# Patient Record
(demographics unavailable — no encounter records)

---

## 2025-02-18 NOTE — HISTORY OF PRESENT ILLNESS
[No Personal or Family History of Easy Bruising, Bleeding, or Issues with General Anesthesia] : No Personal or Family History of easy bruising, bleeding, or issues with general anesthesia [de-identified] : Juan is a 23 day old baby boy here for evaluation of tongue tie and nasal congestion.  Here with parents who provide history.   Tongue tie noted since birth.  Breast feeding and doing fairly well. Some trouble with latching. Sometimes milk dribbles down side of mouth.  No significant maternal pain with breast feeding.  Sometimes with clicking noise while eating.  No stridor.  Gaining weight. Today 9lb 1.75oz (45th percentile for weight).   Also with nasal congestion.  Worse when spits up.  Using nasal bulb without much mucus.   No hearing concerns.  Passed  hearing test.  No other otolaryngology concerns today.

## 2025-02-18 NOTE — PHYSICAL EXAM
[Normal Gait and Station] : normal gait and station [Normal muscle strength, symmetry and tone of facial, head and neck musculature] : normal muscle strength, symmetry and tone of facial, head and neck musculature [Normal] : no cervical lymphadenopathy [Exposed Vessel] : left anterior vessel not exposed [Increased Work of Breathing] : no increased work of breathing with use of accessory muscles and retractions [de-identified] : tongue tie

## 2025-02-18 NOTE — CONSULT LETTER
[Dear  ___] : Dear  [unfilled], [Courtesy Letter:] : I had the pleasure of seeing your patient, [unfilled], in my office today. [Please see my note below.] : Please see my note below. [Sincerely,] : Sincerely, [FreeTextEntry3] : Twila Johnson M.D. Pediatric [place] [address] Tel [phone] Fax [phone]

## 2025-02-18 NOTE — REASON FOR VISIT
[Initial Evaluation] : an initial evaluation for [Parents] : parents [FreeTextEntry2] : Tongue tie, nasal congestion

## 2025-02-18 NOTE — PROCEDURE
[Flexible Scope  (R)] : Flexible Scope (R) [Flexible Scope  (L)] : Flexible Scope (L) [None] : None [FreeTextEntry1] : Nasal Congestion [FreeTextEntry2] : Nasal Congestion [FreeTextEntry3] : After informed verbal consent, nasal endoscopy was performed due to anterior rhinoscopy insufficient to account for symptoms.   The nasal endoscope is passed via the nasal cavity. Flexible endoscope was used. Septum was midline. The inferior, middle, and superior turbinates and inferior, middle and superior meati were examined and with healthy appearing mucosa. The osteomeatal complex is clear with no polyposis or purulence. The sphenoethmoidal recess is clear with no polyposis or purulence. No masses or lesions noted. The choana is patent. The opposite nasal cavity and nasopharynx was examined with similar findings.   There is 10% obstruction of the nasopharynx with adenoid tissue. No other masses.  Normal closure of the velopharyngeal sphincter. Normal shape and mucosalization of the epiglottis, normal appearing aryepiglottic folds and arytenoids. Normal mobility of right and left vocal cords. No vocal cord lesions. Subglottis as able to visualize appears patent. Normal appearing post-cricoid region without pooling of secretions.   Patient tolerated the procedure well.

## 2025-02-18 NOTE — ASSESSMENT
[FreeTextEntry1] : We reviewed nasal endoscopy and laryngoscopy findings which were normal. We discussed use of nasal saline and nasal suctioning as needed for nasal congestion.  We discussed the risks, benefits, alternatives, and personnel involved in lingual frenulectomy. Risks include, but are not limited to, bleeding potentially requiring cauterization, scarring, lingual reattachment, and damage to surrounding structures including salivary ductal opening. Parents express understanding and provides consent for the procedure.   Frenulectomy completed in office today. The patient was observed for 15 minutes and no additional bleeding was noted.

## 2025-02-19 NOTE — PROCEDURE
[FreeTextEntry6] : Dx:  Congenital ear deformity, right and left  Procedure:  Infant ear molding using silicone prosthesis CPT- 58367H/ 69047Q	[id number]   Physician:  Ephraim Espinosa M.D., [facility]  Anesthesia:  none  Complications;  none  Condition:  good  Clinical Summary: The patient was noted to have a bilateral congenital ear deformity at birth, which has not improved. The patient is referred by pediatrician for correction of the deformity using infant ear molding.  There is a constricted ear deformity of the left and right ears that are amenable to ear molding.    Procedure Note: After completion of feeding, the baby was swaddled and laid on the left side. A silicone impression was taken using putty. The ear was measured for size and an appropriate base was fashioned from a  large cradle.  A medium retainer was bent and fabricated to the  appropriate size to prevent  encroachment on the retroauricular sulcus and there was adequate dimension within the cradle for the full dimension of the pinna.  Hair was then shaved to leave approximately a one quarter of an inch boundary beyond the adhesive footplate of the posterior cradle. Skin prep was next done with alcohol pads to remove any residual skin oil. The posterior cradle was then slipped over the ear and the posterior conformer aligned precisely with the desired position of the superior limb of the triangular fossa. Care was taken to leave approximately a 1 mm space between the posterior conformer and the retroauricular sulcus. The pinna was then displaced back into the cradle to ensure that the superior limb of the triangular fossa was in perfect alignment with the anti-helical fold. Next the adhesive liners of the posterior cradle were removed allowing the cradle to be secured to the scalp. In addition it was necessary to bend the retractor so as to conform to the ideal shape of the helical rim. The retractor was directly positioned over the abnormal shape of the helical rim. With these adjustments made the internal adhesive liners were removed and the retractor affixed to the inner surface of the cradle. The baby was then placed on the opposite side and the contralateral identical procedure was performed.

## 2025-02-19 NOTE — REVIEW OF SYSTEMS
[As Noted in HPI] : as noted in HPI [Negative] : Heme/Lymph [de-identified] : baby acne, seborrheic dermatitia

## 2025-02-19 NOTE — HISTORY OF PRESENT ILLNESS
[FreeTextEntry1] : A 23-day-old infant presents to the office with congenital bilateral ear deformities, which were noted at birth and have not shown any improvement. The baby was born at 37.6 weeks via vaginal delivery, with no reported complications during pregnancy or delivery.   -Birth Weight: 6.8 lbs - Current Weigh: 9.2 lbs - Feeding: Breast milk  The parents report no family history of ear deformities. The infant is otherwise healthy, with normal feeding and elimination patterns, and has shown normal development up to this point. mom is  NP

## 2025-02-19 NOTE — PROCEDURE
[FreeTextEntry6] : Dx:  Congenital ear deformity, right and left  Procedure:  Infant ear molding using silicone prosthesis CPT- 35580S/ 69272W	[id number]   Physician:  Ephraim Espinosa M.D., [facility]  Anesthesia:  none  Complications;  none  Condition:  good  Clinical Summary: The patient was noted to have a bilateral congenital ear deformity at birth, which has not improved. The patient is referred by pediatrician for correction of the deformity using infant ear molding.  There is a constricted ear deformity of the left and right ears that are amenable to ear molding.    Procedure Note: After completion of feeding, the baby was swaddled and laid on the left side. A silicone impression was taken using putty. The ear was measured for size and an appropriate base was fashioned from a  large cradle.  A medium retainer was bent and fabricated to the  appropriate size to prevent  encroachment on the retroauricular sulcus and there was adequate dimension within the cradle for the full dimension of the pinna.  Hair was then shaved to leave approximately a one quarter of an inch boundary beyond the adhesive footplate of the posterior cradle. Skin prep was next done with alcohol pads to remove any residual skin oil. The posterior cradle was then slipped over the ear and the posterior conformer aligned precisely with the desired position of the superior limb of the triangular fossa. Care was taken to leave approximately a 1 mm space between the posterior conformer and the retroauricular sulcus. The pinna was then displaced back into the cradle to ensure that the superior limb of the triangular fossa was in perfect alignment with the anti-helical fold. Next the adhesive liners of the posterior cradle were removed allowing the cradle to be secured to the scalp. In addition it was necessary to bend the retractor so as to conform to the ideal shape of the helical rim. The retractor was directly positioned over the abnormal shape of the helical rim. With these adjustments made the internal adhesive liners were removed and the retractor affixed to the inner surface of the cradle. The baby was then placed on the opposite side and the contralateral identical procedure was performed.

## 2025-02-19 NOTE — REVIEW OF SYSTEMS
[As Noted in HPI] : as noted in HPI [Negative] : Heme/Lymph [de-identified] : baby acne, seborrheic dermatitia

## 2025-03-11 NOTE — CONSULT LETTER
[Dear  ___] : Dear  [unfilled], [Consult Letter:] : I had the pleasure of evaluating your patient, [unfilled]. [Please see my note below.] : Please see my note below. [Consult Closing:] : Thank you very much for allowing me to participate in the care of this patient.  If you have any questions, please do not hesitate to contact me. [Sincerely,] : Sincerely, [FreeTextEntry3] : Dr. Constance Olivera Department of Dermatology [facility]

## 2025-03-11 NOTE — ASSESSMENT
[Use of independent historian: [ enter independent historian's relationship to patient ] :____] : As the patient was unable to provide a complete and reliable history, I obtained clinical history from the patient's [unfilled] [FreeTextEntry1] : #Impetigo new diagnosis of uncertain prognosis - education and counseling - start Cephalexin 40mg/kg div TID x 7 days - 1.4mL of 250mg/5mL soln TID - advised not to cross contaminate personal care products take probiotic  # Atopic Dermatitis - mild new diagnosis of uncertain prognosis - we have discussed the nature and course of this condition, treatment options and expectations. - apply Hydrocortisone 2.5% ointment to face/neck BID PRN flares w roughness/itch. Do not use on eyelids  Dry skin care reviewed:  - Take short showers/baths (avoid hot water)  - Use a mild soap (eg. CeraVe cleanser or Aquaphor)  - Use soap only on areas truly needed (underarms,groin,buttocks,fold areas, feet, face, hair)  - Pat off excess water and put moisturizer on immediately (within 3 min.)              Good moisturizing choices include:                       1. Cetaphil cream (not baby Cetaphil)                       2. CeraVe cream                       3. Vanicream cream                       4. Aquaphor ointment                       5. Vaseline ointment                       6. CeraVe ointment  - A moisturizer should always be applied after showering or bathing, but may be applied as many additional times as is necessary.  RTC

## 2025-03-11 NOTE — PHYSICAL EXAM
[Well Nourished] : well nourished [Conjunctiva Non-injected] : conjunctiva non-injected [No Visual Lymphadenopathy] : no visual  lymphadenopathy [No Clubbing] : no clubbing [No Edema] : no edema [No Bromhidrosis] : no bromhidrosis [No Chromhidrosis] : no chromhidrosis [FreeTextEntry3] : round red erosions, some with yellow crusting, scattered on the trunk, extremities, hands roughness and follicular prominence along trunk. cheeks erythematous plaques with thick

## 2025-03-11 NOTE — HISTORY OF PRESENT ILLNESS
[FreeTextEntry1] : npa - rash [de-identified] : Pt is a 1 month old M presenting for red lesions on body. Pt was wearing ear molds that got infected, culture grew MSSA. After developing the infection, he began to develop round red erosions first on the upper extremities then spreading to trunk/legs. Mom applied mupirocin w some improvement. Continuing to develop new lesions. Also with dry skin and flaky scalp. Never with fevers, chills, fussiness.  Notably, older sister Elisa w impetigo as well.  Freq of baths: Daily Soap: CeraVe Moisturizer: CeraVe Healing Oint Detergent: All Free and Clear

## 2025-03-28 NOTE — CONSULT LETTER
[Dear  ___] : Dear  [unfilled], [Consult Letter:] : I had the pleasure of evaluating your patient, [unfilled]. [Please see my note below.] : Please see my note below. [Consult Closing:] : Thank you very much for allowing me to participate in the care of this patient.  If you have any questions, please do not hesitate to contact me. [Sincerely,] : Sincerely, [FreeTextEntry3] : Dr. Constance Olivera Department of Dermatology [facility]

## 2025-03-28 NOTE — ASSESSMENT
[Use of independent historian: [ enter independent historian's relationship to patient ] :____] : As the patient was unable to provide a complete and reliable history, I obtained clinical history from the patient's [unfilled] [FreeTextEntry1] : #Impetigo - education and counseling, ensure all family members are treated  - start Cephalexin 40mg/kg div TID x 10-14 days - 1.4mL of 250mg/5mL soln TID - Mupirocin TID  - CLN wash, may use hibiclens while patient has active lesions   - advised not to cross contaminate personal care products - take probiotic  # Atopic Dermatitis - mild - we have discussed the nature and course of this condition, treatment options and expectations. - apply Hydrocortisone 2.5% ointment to face/neck BID PRN flares w roughness/itch. Do not use on eyelids  Dry skin care reviewed:  - Take short showers/baths (avoid hot water)  - Use a mild soap (eg. CeraVe cleanser or Aquaphor)  - Use soap only on areas truly needed (underarms,groin,buttocks,fold areas, feet, face, hair)  - Pat off excess water and put moisturizer on immediately (within 3 min.)              Good moisturizing choices include:                       1. Cetaphil cream (not baby Cetaphil)                       2. CeraVe cream                       3. Vanicream cream                       4. Aquaphor ointment                       5. Vaseline ointment                       6. CeraVe ointment  - A moisturizer should always be applied after showering or bathing, but may be applied as many additional times as is necessary.  RTC 1m or PRN

## 2025-03-28 NOTE — HISTORY OF PRESENT ILLNESS
[FreeTextEntry1] : f/u impetigo  [de-identified] : Pt is a 2 month old M presenting for follow up of bullous impetigo. Patient just completed a 7 day course of Keflex and mupirocin and rash had cleared totally for a few days until grandmother watched the child. Now he has new lesions to right ear, trunk, and extremities. Mom is concerned grandma may be colonized. Mom now also has a new honey-crusted appearing lesion on her nose. No recent fevers.   Freq of baths: Daily Soap: La Roche Oil Cleanser  Moisturizer: Vaseline  Detergent: All Free and Clear  History:  Pt was wearing ear molds that got infected, culture grew MSSA. Notably, older sister Elisa w impetigo as well.

## 2025-03-28 NOTE — CONSULT LETTER
[Dear  ___] : Dear  [unfilled], [Consult Letter:] : I had the pleasure of evaluating your patient, [unfilled]. [Please see my note below.] : Please see my note below. [Consult Closing:] : Thank you very much for allowing me to participate in the care of this patient.  If you have any questions, please do not hesitate to contact me. [Sincerely,] : Sincerely, [FreeTextEntry3] : Dr. Constance Olivera Department of Dermatology [facility]

## 2025-03-28 NOTE — HISTORY OF PRESENT ILLNESS
[FreeTextEntry1] : f/u impetigo  [de-identified] : Pt is a 2 month old M presenting for follow up of bullous impetigo. Patient just completed a 7 day course of Keflex and mupirocin and rash had cleared totally for a few days until grandmother watched the child. Now he has new lesions to right ear, trunk, and extremities. Mom is concerned grandma may be colonized. Mom now also has a new honey-crusted appearing lesion on her nose. No recent fevers.   Freq of baths: Daily Soap: La Roche Oil Cleanser  Moisturizer: Vaseline  Detergent: All Free and Clear  History:  Pt was wearing ear molds that got infected, culture grew MSSA. Notably, older sister Elisa w impetigo as well.

## 2025-03-28 NOTE — PHYSICAL EXAM
[Alert] : alert [Well Nourished] : well nourished [Conjunctiva Non-injected] : conjunctiva non-injected [FreeTextEntry3] : round red erosions, some with yellow crusting, scattered on the trunk, extremities; red erosion with some weepiness to right posterior auricular region   roughness and follicular prominence along trunk